# Patient Record
Sex: FEMALE | Race: WHITE | Employment: FULL TIME | URBAN - METROPOLITAN AREA
[De-identification: names, ages, dates, MRNs, and addresses within clinical notes are randomized per-mention and may not be internally consistent; named-entity substitution may affect disease eponyms.]

---

## 2019-08-16 ENCOUNTER — RECORDS - HEALTHEAST (OUTPATIENT)
Dept: LAB | Facility: CLINIC | Age: 29
End: 2019-08-16

## 2019-08-16 LAB
ALBUMIN SERPL-MCNC: 4.2 G/DL (ref 3.5–5)
ALP SERPL-CCNC: 52 U/L (ref 45–120)
ALT SERPL W P-5'-P-CCNC: 42 U/L (ref 0–45)
ANION GAP SERPL CALCULATED.3IONS-SCNC: 7 MMOL/L (ref 5–18)
AST SERPL W P-5'-P-CCNC: 28 U/L (ref 0–40)
BILIRUB SERPL-MCNC: 0.2 MG/DL (ref 0–1)
BUN SERPL-MCNC: 15 MG/DL (ref 8–22)
CALCIUM SERPL-MCNC: 9.6 MG/DL (ref 8.5–10.5)
CHLORIDE BLD-SCNC: 104 MMOL/L (ref 98–107)
CHOLEST SERPL-MCNC: 179 MG/DL
CO2 SERPL-SCNC: 25 MMOL/L (ref 22–31)
CREAT SERPL-MCNC: 0.96 MG/DL (ref 0.6–1.1)
FASTING STATUS PATIENT QL REPORTED: ABNORMAL
GFR SERPL CREATININE-BSD FRML MDRD: >60 ML/MIN/1.73M2
GLUCOSE BLD-MCNC: 100 MG/DL (ref 70–125)
HDLC SERPL-MCNC: 50 MG/DL
LDLC SERPL CALC-MCNC: 94 MG/DL
POTASSIUM BLD-SCNC: 4.1 MMOL/L (ref 3.5–5)
PROT SERPL-MCNC: 6.6 G/DL (ref 6–8)
SODIUM SERPL-SCNC: 136 MMOL/L (ref 136–145)
TRIGL SERPL-MCNC: 174 MG/DL

## 2019-08-21 LAB
HPV SOURCE: NORMAL
HUMAN PAPILLOMA VIRUS 16 DNA: NEGATIVE
HUMAN PAPILLOMA VIRUS 18 DNA: NEGATIVE
HUMAN PAPILLOMA VIRUS FINAL DIAGNOSIS: NORMAL
HUMAN PAPILLOMA VIRUS OTHER HR: NEGATIVE
SPECIMEN DESCRIPTION: NORMAL

## 2019-08-27 LAB
BKR LAB AP ABNORMAL BLEEDING: NO
BKR LAB AP BIRTH CONTROL/HORMONES: NORMAL
BKR LAB AP CERVICAL APPEARANCE: NORMAL
BKR LAB AP GYN ADEQUACY: NORMAL
BKR LAB AP GYN INTERPRETATION: NORMAL
BKR LAB AP HPV REFLEX: NORMAL
BKR LAB AP LMP: NORMAL
BKR LAB AP PATIENT STATUS: NORMAL
BKR LAB AP PREVIOUS ABNORMAL: NORMAL
BKR LAB AP PREVIOUS NORMAL: 2015
HIGH RISK?: NO
PATH REPORT.COMMENTS IMP SPEC: NORMAL
RESULT FLAG (HE HISTORICAL CONVERSION): NORMAL

## 2020-04-27 ENCOUNTER — RECORDS - HEALTHEAST (OUTPATIENT)
Dept: LAB | Facility: CLINIC | Age: 30
End: 2020-04-27

## 2020-04-27 LAB
ALBUMIN SERPL-MCNC: 3.9 G/DL (ref 3.5–5)
ALP SERPL-CCNC: 53 U/L (ref 45–120)
ALT SERPL W P-5'-P-CCNC: 16 U/L (ref 0–45)
ANION GAP SERPL CALCULATED.3IONS-SCNC: 8 MMOL/L (ref 5–18)
AST SERPL W P-5'-P-CCNC: 15 U/L (ref 0–40)
BILIRUB SERPL-MCNC: 0.4 MG/DL (ref 0–1)
BUN SERPL-MCNC: 12 MG/DL (ref 8–22)
CALCIUM SERPL-MCNC: 9.2 MG/DL (ref 8.5–10.5)
CHLORIDE BLD-SCNC: 105 MMOL/L (ref 98–107)
CHOLEST SERPL-MCNC: 211 MG/DL
CO2 SERPL-SCNC: 24 MMOL/L (ref 22–31)
CREAT SERPL-MCNC: 1 MG/DL (ref 0.6–1.1)
FASTING STATUS PATIENT QL REPORTED: ABNORMAL
GFR SERPL CREATININE-BSD FRML MDRD: >60 ML/MIN/1.73M2
GLUCOSE BLD-MCNC: 84 MG/DL (ref 70–125)
HDLC SERPL-MCNC: 59 MG/DL
LDLC SERPL CALC-MCNC: 133 MG/DL
POTASSIUM BLD-SCNC: 4 MMOL/L (ref 3.5–5)
PROT SERPL-MCNC: 6.7 G/DL (ref 6–8)
SODIUM SERPL-SCNC: 137 MMOL/L (ref 136–145)
TRIGL SERPL-MCNC: 97 MG/DL

## 2021-12-28 ENCOUNTER — LAB REQUISITION (OUTPATIENT)
Dept: LAB | Facility: CLINIC | Age: 31
End: 2021-12-28
Payer: COMMERCIAL

## 2021-12-28 DIAGNOSIS — E78.5 HYPERLIPIDEMIA, UNSPECIFIED: ICD-10-CM

## 2021-12-28 DIAGNOSIS — R11.0 NAUSEA: ICD-10-CM

## 2021-12-28 LAB
ALBUMIN SERPL-MCNC: 3.6 G/DL (ref 3.5–5)
ALP SERPL-CCNC: 59 U/L (ref 45–120)
ALT SERPL W P-5'-P-CCNC: 14 U/L (ref 0–45)
ANION GAP SERPL CALCULATED.3IONS-SCNC: 7 MMOL/L (ref 5–18)
AST SERPL W P-5'-P-CCNC: 14 U/L (ref 0–40)
BILIRUB SERPL-MCNC: 0.2 MG/DL (ref 0–1)
BUN SERPL-MCNC: 11 MG/DL (ref 8–22)
CALCIUM SERPL-MCNC: 9.2 MG/DL (ref 8.5–10.5)
CHLORIDE BLD-SCNC: 109 MMOL/L (ref 98–107)
CHOLEST SERPL-MCNC: 202 MG/DL
CO2 SERPL-SCNC: 23 MMOL/L (ref 22–31)
CREAT SERPL-MCNC: 0.85 MG/DL (ref 0.6–1.1)
FASTING STATUS PATIENT QL REPORTED: ABNORMAL
GFR SERPL CREATININE-BSD FRML MDRD: >90 ML/MIN/1.73M2
GLUCOSE BLD-MCNC: 96 MG/DL (ref 70–125)
HDLC SERPL-MCNC: 52 MG/DL
LDLC SERPL CALC-MCNC: 135 MG/DL
POTASSIUM BLD-SCNC: 4.4 MMOL/L (ref 3.5–5)
PROT SERPL-MCNC: 6.2 G/DL (ref 6–8)
SODIUM SERPL-SCNC: 139 MMOL/L (ref 136–145)
TRIGL SERPL-MCNC: 75 MG/DL

## 2021-12-28 PROCEDURE — 80061 LIPID PANEL: CPT | Mod: ORL | Performed by: FAMILY MEDICINE

## 2021-12-28 PROCEDURE — 80053 COMPREHEN METABOLIC PANEL: CPT | Mod: ORL | Performed by: FAMILY MEDICINE

## 2022-08-29 ENCOUNTER — LAB REQUISITION (OUTPATIENT)
Dept: LAB | Facility: CLINIC | Age: 32
End: 2022-08-29

## 2022-08-29 DIAGNOSIS — N39.0 URINARY TRACT INFECTION, SITE NOT SPECIFIED: ICD-10-CM

## 2022-08-29 PROCEDURE — 87086 URINE CULTURE/COLONY COUNT: CPT | Performed by: FAMILY MEDICINE

## 2022-08-30 LAB — BACTERIA UR CULT: ABNORMAL

## 2022-10-04 ENCOUNTER — LAB REQUISITION (OUTPATIENT)
Dept: LAB | Facility: CLINIC | Age: 32
End: 2022-10-04

## 2022-10-04 LAB — TSH SERPL DL<=0.005 MIU/L-ACNC: 0.99 UIU/ML (ref 0.3–4.2)

## 2022-10-04 PROCEDURE — 84443 ASSAY THYROID STIM HORMONE: CPT | Performed by: FAMILY MEDICINE

## 2023-01-03 ENCOUNTER — LAB REQUISITION (OUTPATIENT)
Dept: LAB | Facility: CLINIC | Age: 33
End: 2023-01-03

## 2023-01-03 DIAGNOSIS — Z13.220 ENCOUNTER FOR SCREENING FOR LIPOID DISORDERS: ICD-10-CM

## 2023-01-03 LAB
CHOLEST SERPL-MCNC: 186 MG/DL
HDLC SERPL-MCNC: 48 MG/DL
LDLC SERPL CALC-MCNC: 119 MG/DL
NONHDLC SERPL-MCNC: 138 MG/DL
TRIGL SERPL-MCNC: 95 MG/DL

## 2023-01-03 PROCEDURE — 80061 LIPID PANEL: CPT | Performed by: FAMILY MEDICINE

## 2023-10-05 ENCOUNTER — LAB REQUISITION (OUTPATIENT)
Dept: LAB | Facility: CLINIC | Age: 33
End: 2023-10-05

## 2023-10-05 DIAGNOSIS — R30.0 DYSURIA: ICD-10-CM

## 2023-10-05 PROCEDURE — 87086 URINE CULTURE/COLONY COUNT: CPT | Performed by: FAMILY MEDICINE

## 2023-10-07 LAB — BACTERIA UR CULT: NORMAL

## 2024-04-05 ENCOUNTER — LAB REQUISITION (OUTPATIENT)
Dept: LAB | Facility: CLINIC | Age: 34
End: 2024-04-05

## 2024-04-05 DIAGNOSIS — Z01.419 ENCOUNTER FOR GYNECOLOGICAL EXAMINATION (GENERAL) (ROUTINE) WITHOUT ABNORMAL FINDINGS: ICD-10-CM

## 2024-04-05 DIAGNOSIS — Z13.1 ENCOUNTER FOR SCREENING FOR DIABETES MELLITUS: ICD-10-CM

## 2024-04-05 DIAGNOSIS — E78.5 HYPERLIPIDEMIA, UNSPECIFIED: ICD-10-CM

## 2024-04-05 PROCEDURE — 80048 BASIC METABOLIC PNL TOTAL CA: CPT | Performed by: FAMILY MEDICINE

## 2024-04-05 PROCEDURE — 80061 LIPID PANEL: CPT | Performed by: FAMILY MEDICINE

## 2024-04-05 PROCEDURE — G0145 SCR C/V CYTO,THINLAYER,RESCR: HCPCS | Performed by: FAMILY MEDICINE

## 2024-04-05 PROCEDURE — 87624 HPV HI-RISK TYP POOLED RSLT: CPT | Performed by: FAMILY MEDICINE

## 2024-04-06 LAB
ANION GAP SERPL CALCULATED.3IONS-SCNC: 11 MMOL/L (ref 7–15)
BUN SERPL-MCNC: 18.4 MG/DL (ref 6–20)
CALCIUM SERPL-MCNC: 9.6 MG/DL (ref 8.6–10)
CHLORIDE SERPL-SCNC: 99 MMOL/L (ref 98–107)
CHOLEST SERPL-MCNC: 208 MG/DL
CREAT SERPL-MCNC: 1 MG/DL (ref 0.51–0.95)
DEPRECATED HCO3 PLAS-SCNC: 25 MMOL/L (ref 22–29)
EGFRCR SERPLBLD CKD-EPI 2021: 75 ML/MIN/1.73M2
FASTING STATUS PATIENT QL REPORTED: ABNORMAL
GLUCOSE SERPL-MCNC: 88 MG/DL (ref 70–99)
HDLC SERPL-MCNC: 52 MG/DL
LDLC SERPL CALC-MCNC: 132 MG/DL
NONHDLC SERPL-MCNC: 156 MG/DL
POTASSIUM SERPL-SCNC: 3.8 MMOL/L (ref 3.4–5.3)
SODIUM SERPL-SCNC: 135 MMOL/L (ref 135–145)
TRIGL SERPL-MCNC: 121 MG/DL

## 2024-04-10 LAB
BKR LAB AP GYN ADEQUACY: NORMAL
BKR LAB AP GYN INTERPRETATION: NORMAL
BKR LAB AP HPV REFLEX: NORMAL
BKR LAB AP PREVIOUS ABNORMAL: NORMAL
PATH REPORT.COMMENTS IMP SPEC: NORMAL
PATH REPORT.COMMENTS IMP SPEC: NORMAL
PATH REPORT.RELEVANT HX SPEC: NORMAL

## 2024-04-15 LAB
HUMAN PAPILLOMA VIRUS 16 DNA: NEGATIVE
HUMAN PAPILLOMA VIRUS 18 DNA: NEGATIVE
HUMAN PAPILLOMA VIRUS FINAL DIAGNOSIS: NORMAL
HUMAN PAPILLOMA VIRUS OTHER HR: NEGATIVE

## 2024-04-26 ENCOUNTER — OFFICE VISIT (OUTPATIENT)
Dept: PHARMACY | Facility: PHYSICIAN GROUP | Age: 34
End: 2024-04-26
Payer: COMMERCIAL

## 2024-04-26 DIAGNOSIS — G43.909 MIGRAINE WITHOUT STATUS MIGRAINOSUS, NOT INTRACTABLE, UNSPECIFIED MIGRAINE TYPE: ICD-10-CM

## 2024-04-26 DIAGNOSIS — Z13.79 ENCOUNTER FOR PHARMACOGENETIC TESTING: ICD-10-CM

## 2024-04-26 DIAGNOSIS — E88.89 CYP2C19 RAPID METABOLIZER (H): ICD-10-CM

## 2024-04-26 DIAGNOSIS — Z78.9 TAKES DIETARY SUPPLEMENTS: ICD-10-CM

## 2024-04-26 DIAGNOSIS — R25.1 TREMOR: ICD-10-CM

## 2024-04-26 DIAGNOSIS — G47.00 INSOMNIA, UNSPECIFIED TYPE: ICD-10-CM

## 2024-04-26 DIAGNOSIS — F41.9 ANXIETY: Primary | ICD-10-CM

## 2024-04-26 DIAGNOSIS — G47.00 INSOMNIA: ICD-10-CM

## 2024-04-26 DIAGNOSIS — K21.9 GASTROESOPHAGEAL REFLUX DISEASE, UNSPECIFIED WHETHER ESOPHAGITIS PRESENT: ICD-10-CM

## 2024-04-26 DIAGNOSIS — F90.1 ATTENTION DEFICIT HYPERACTIVITY DISORDER (ADHD), PREDOMINANTLY HYPERACTIVE TYPE: ICD-10-CM

## 2024-04-26 DIAGNOSIS — N94.6 DYSMENORRHEA: ICD-10-CM

## 2024-04-26 DIAGNOSIS — F32.9 MAJOR DEPRESSIVE DISORDER, REMISSION STATUS UNSPECIFIED, UNSPECIFIED WHETHER RECURRENT: ICD-10-CM

## 2024-04-26 PROCEDURE — 99605 MTMS BY PHARM NP 15 MIN: CPT | Performed by: PHARMACIST

## 2024-04-26 PROCEDURE — 99607 MTMS BY PHARM ADDL 15 MIN: CPT | Performed by: PHARMACIST

## 2024-04-26 NOTE — PROGRESS NOTES
Medication Therapy Management (MTM) Encounter    ASSESSMENT:                            Medication Adherence/Access: No issues identified  _  Pharmacogenetic Assessment and Recommendations:   Variability in CYP2D6, VGQ9O89, CYP2C9 and other genes can impact the effectiveness and risk toxicity of certain medications used. Based on this patient's pharmacogenetic test results and clinical assessment, consider the following:   - [id number] rapid metabolizer- may have increased metabolism and reduced levels of citalopram and escitalopram when tried in the past. Sertraline is less impacted and IC guidelines recommend initiating therapy at recommended starting dose. May need to titrate dose up further based on response. Omeprazole is a moderate inhibitor so may also help achieve adequate drug levels.     - CYP2D6 Poor Metabolizer- expected to have increased levels of medications metabolized by CYP2D6 (e.g., TCAs, atomoxetine, paroxetine, fluvoxamine, ondansetron ) and may be at increased risk of side effects. Expected to have decreased levels of medications activated by CYP2D6 (e.g., codeine, tramadol) and may be at risk for lack of efficacy.     - Patient has decreased conversion of folic acid to the active form of L-methylfolate.  This genotype has been associated with decreased response to antidepressants.  There have been some clinical trials completed, but not enough for clinical guidelines or the FDA to recommend starting a supplement.  Trial on supplement would not likely be harmful and may provide some benefit.  L-methylfolate has some evidence for benefit in depression treatment, it's effect on anxiety have not been studied.  Could consider trying L-methylfolate supplement 10-15 mg daily for 2-3 months to see if it offers benefit for mood.  _  Anxiety, Depression, and Insomnia  Discussed medication options with patient at length, including vilazodone, sertraline and fluoxetine. Higher risk for GI side effects with  vilazodone is concerning for patient. She has hesitation with trying fluoxetine. Based on symptoms, pharmacogenetic results, and previous medication trials she may benefit from trying sertraline. Reviewed sertraline action, dosing, onset of benefits, and possible side effects. May need to increase up to higher dose to see full benefit. Reviewed limited evidence with l-methylfolate supplementation and she would like to try this. Appropriate to continue current trazodone, hydroxyzine and medical cannabis as needed. Encouraged to continue to try to establish care with a new psychiatry provider.   _  ADHD:    Stable.   _  Migraine:   Stable.   _  Tremors:    Stable.  _  GERD    Stable.  _  Dysmenorrhea:    Stable.  _  Supplements   No significant interactions with current supplements and medications/conditions. Reviewed evidence of current supplements with her and if no benefit seen may consider stopping zinc and ashwaghanda one at time to see if notes any changes.     PLAN:                            Start sertraline (Zoloft) 50 mg once daily.   May need to increase dose more quickly, check in after 4 weeks if not with a new psychiatrist to see if we should increase the dose. It can take 4-6 weeks to see the full effect.     2. Okay to try l-methylfolate 15 mg once daily.     Sent order to your pharmacy bit if it is not covered by your insurance you can try using a [employer] coupon to see if it helps with the cost.     Studies have shown that L-methylfolate supplement alone or in addition to an antidepressant can help reduce depressive symptoms. One study has shown that depressed patients with variation in MTHFR may benefit more from taking L-methylfolate. L-methylfolate doses used in the studies varied, but 15 mg once daily was most common. Recommend trying for 3 months and if not noticing any difference then stop.     Follow-up: with me or Dr. Street in 4 weeks    SUBJECTIVE/OBJECTIVE:                           Arpita Pendleton is a 33 year old female coming in for an initial visit. She was referred to me from Dr. Street.      Reason for visit: Mental health medications, discuss pharmacogenetic results she had done previously.    Allergies/ADRs: Reviewed in chart  Past Medical History: Reviewed in chart  Tobacco: She reports that she has been smoking cigarettes. She has never used smokeless tobacco.Nicotine/Tobacco Cessation Plan  Self help information given to patient  Alcohol: none, history of alcohol dependence, 5 years sober    Medication Adherence/Access: no issues reported    Pharmacogenetic (PGx) Results:   She had [employer] test done through her previous psychiatrist. She brings in the results today to review and discuss medication options.     Results relevant for PGx consult reason:  - [id number]  rapid metabolizer: increased TBM7Z00 function  - CYP2D6 poor metabolizer: no CYP2D6 function   - CYP2B6 intermediate metabolizer: decreased CYP2B6 function  - CYP2C9 intermediate metabolizer: decreased CYP2C9 function     Other notable PGx results:  - DUXT8H7 decreased function    Genes with lower quality of evidence:  * Not as many large powered studies are available for the below genes.  There are not currently treatment recommendations or guidelines regarding these genes.    - MTHFR 677 CT, 1298 AC- severely decreased activity    Drug interactions (as of 5/1/2024):  Omeprazole : [id number] Moderate Inhibitor --> results in phenoconversion to TTL8V27 normal metabolizer    Anxiety, Depression, and Insomnia   Trazodone 150 mg once daily  Hydroxyzine 25 mg at bedtime, and as needed   Cannabis mix vaporized/flower- hybrid daily, indica at night   Patient reports no current medication side effects. She had been following with psychiatry for prescribing but is in the process of finding a new psychiatrist. She is not currently taking any maintenance antidepressant or anxiety medications. She does feel like it would be helpful for her.  She has had many medication trials in the past, she can't really remember her experience with most of them. She thinks most were just not effective.   Medication History: per patient report, may be more  - Wellbutrin  - Abilify  - citalopram  - escitalopram  - venlafaxine- angry  - quetiapine  - duloxetine  - Vraylar     ADHD:    Adderall XR 15 mg once daily in morning  Adderall XR 10 mg once daily afternoon    This has been very helpful for her. No issues reported. She does have sleep trouble and takes trazodone but this was the case before Adderall and did not worsen since being on it.     Migraine:   Preventive medications  Propranolol 10 mg once daily-   Acute medications  Sumatriptan 100 mg  Ibuprofen as needed   Patient reports having 2 headache days last month requiring sumatriptan. She doesn't really like how she feels when she takes sumatriptan so typically only takes when she can feel migraine coming on to stop it. She also rests, goes in a dark room which helps. Reports propranolol was started for her tremors but she isn't sure if she noticed an improvement in her migraines since taking it. She has not been on a higher dose.     Tremors:    Propranolol 10 mg once daily  She reports taking this for tremors. She does think it helps. No issues reported.       GERD    Omeprazole 20 mg once daily   Patient feels that current regimen is effective.  The patient does notice symptoms if they miss a dose. She has had a lot of stomach issues in the past. Her symptoms seem to be pretty well controlled currently.        Dysmenorrhea:    Mirena IUD  No issues reported. IUD good until 2029.       Recurrent cold sores:    Valacyclovir 1 g 2 tablets at onset, then repeat 2 tablets in 12 hours  Not used recently. Does help when she has had to use it. No issues reported.      Supplements   Vitamin D 5000 units daily  B complex (pure encapasations) 1 tablet daily  Ashwaganda Youtheory 500 mg 2 capsules  Multivitamin women  (centrum) 1 tablet daily  Magnesium citrate 250 mg daily   Zinc 30 mg once daily- mood?   Rachael 550 mg once daily   No reported issues at this time. She started taking some of these supplements at the recommendation of her previous psychiatry provider. She isn't sure if she has noticed much different in her mood with them. She does think the rachael helps her stomach.         Today's Vitals: /70 (BP Location: Right arm, Patient Position: Sitting, Cuff Size: Adult Regular)   Pulse 93   ----------------      I spent 60 minutes with this patient today. All changes were made via collaborative practice agreement with Jacqueline Street MD. A copy of the visit note was provided to the patient's provider(s).    A summary of these recommendations was given to the patient.    Michaela Soto, PharmD, [facility]  Medication Therapy Management Pharmacist  [facility]  [phone]           Medication Therapy Recommendations  Major depressive disorder, remission status unspecified, unspecified whether recurrent    Rationale: Synergistic therapy - Needs additional medication therapy - Indication   Recommendation: Start Medication - sertraline 50 MG tablet   Status: Accepted per CPA          Rationale: Synergistic therapy - Needs additional medication therapy - Indication   Recommendation: Start Medication - methylfolate 15 MG Tabs tablet   Status: Accepted per CPA

## 2024-04-26 NOTE — PATIENT INSTRUCTIONS
Recommendations from today's MTM visit:                                                    MTM (medication therapy management) is a service provided by a clinical pharmacist designed to help you get the most of out of your medicines.      Start sertraline (Zoloft) 50 mg once daily.   May need to increase dose more quickly, check in after 4 weeks if not with a new psychiatrist to see if we should increase the dose. It can take 4-6 weeks to see the full effect.     2. Start l-methylfolate 15 mg once daily.     Sent order to your pharmacy bit if it is not covered by your insurance you can try using a [employer] coupon to see if it helps with the cost.     Studies have shown that L-methylfolate supplement alone or in addition to an antidepressant can help reduce depressive symptoms. One study has shown that depressed patients with variation in MTHFR may benefit more from taking L-methylfolate. L-methylfolate doses used in the studies varied, but 15 mg once daily was most common. Recommend trying for 3 months and if not noticing any difference then stop.     Follow-up: with me or Dr. Street in 4 weeks    It was great speaking with you today.  I value your experience and would be very thankful for your time in providing feedback in our clinic survey. In the next few days, you may receive an email or text message from [employer] with a link to a survey related to your clinical pharmacist.    To schedule another MTM appointment, please call [phone].    My Clinical Pharmacist's contact information:                                                      Please feel free to contact me with any questions or concerns you have.      Michaela Soto, PharmD, BCACP  Medication Therapy Management Pharmacist  [facility]  [phone]

## 2024-05-01 VITALS — SYSTOLIC BLOOD PRESSURE: 110 MMHG | HEART RATE: 93 BPM | DIASTOLIC BLOOD PRESSURE: 70 MMHG

## 2024-05-01 PROBLEM — E88.89 CYP2C19 RAPID METABOLIZER (H): Status: ACTIVE | Noted: 2024-05-01

## 2024-05-01 RX ORDER — PARSLEY 450 MG
2 CAPSULE ORAL DAILY
COMMUNITY

## 2024-05-01 RX ORDER — HYDROXYZINE HYDROCHLORIDE 25 MG/1
25 TABLET, FILM COATED ORAL 3 TIMES DAILY PRN
COMMUNITY

## 2024-05-01 RX ORDER — ASCORBIC ACID 125 MG
250 TABLET,CHEWABLE ORAL DAILY
COMMUNITY

## 2024-05-01 RX ORDER — DEXTROAMPHETAMINE SACCHARATE, AMPHETAMINE ASPARTATE MONOHYDRATE, DEXTROAMPHETAMINE SULFATE AND AMPHETAMINE SULFATE 2.5; 2.5; 2.5; 2.5 MG/1; MG/1; MG/1; MG/1
10 CAPSULE, EXTENDED RELEASE ORAL DAILY
COMMUNITY

## 2024-05-01 RX ORDER — DEXTROAMPHETAMINE SACCHARATE, AMPHETAMINE ASPARTATE MONOHYDRATE, DEXTROAMPHETAMINE SULFATE AND AMPHETAMINE SULFATE 3.75; 3.75; 3.75; 3.75 MG/1; MG/1; MG/1; MG/1
15 CAPSULE, EXTENDED RELEASE ORAL DAILY
COMMUNITY

## 2024-05-01 RX ORDER — PROPRANOLOL HYDROCHLORIDE 10 MG/1
10 TABLET ORAL DAILY
COMMUNITY

## 2024-05-01 RX ORDER — GINGER ROOT 550 MG
550 CAPSULE ORAL DAILY
COMMUNITY

## 2024-05-01 RX ORDER — VALACYCLOVIR HYDROCHLORIDE 1 G/1
2000 TABLET, FILM COATED ORAL ONCE
COMMUNITY

## 2024-05-01 RX ORDER — TRAZODONE HYDROCHLORIDE 150 MG/1
150 TABLET ORAL AT BEDTIME
COMMUNITY

## 2024-05-01 RX ORDER — IBUPROFEN 200 MG
200 TABLET ORAL EVERY 8 HOURS PRN
COMMUNITY

## 2024-05-01 RX ORDER — SUMATRIPTAN 100 MG/1
100 TABLET, FILM COATED ORAL
COMMUNITY

## 2024-05-17 ENCOUNTER — OFFICE VISIT (OUTPATIENT)
Dept: PHARMACY | Facility: PHYSICIAN GROUP | Age: 34
End: 2024-05-17
Payer: COMMERCIAL

## 2024-05-17 VITALS — DIASTOLIC BLOOD PRESSURE: 68 MMHG | SYSTOLIC BLOOD PRESSURE: 116 MMHG

## 2024-05-17 DIAGNOSIS — F41.9 ANXIETY: ICD-10-CM

## 2024-05-17 DIAGNOSIS — F32.9 MAJOR DEPRESSIVE DISORDER, REMISSION STATUS UNSPECIFIED, UNSPECIFIED WHETHER RECURRENT: Primary | ICD-10-CM

## 2024-05-17 DIAGNOSIS — G47.00 INSOMNIA, UNSPECIFIED TYPE: ICD-10-CM

## 2024-05-17 PROCEDURE — 99606 MTMS BY PHARM EST 15 MIN: CPT | Performed by: PHARMACIST

## 2024-05-17 PROCEDURE — 99607 MTMS BY PHARM ADDL 15 MIN: CPT | Performed by: PHARMACIST

## 2024-05-17 NOTE — PROGRESS NOTES
Medication Therapy Management (MTM) Encounter    ASSESSMENT:                            Medication Adherence/Access: No issues identified    Mental Health:   Tolerating sertraline well with appears to be some symptoms improvement given reduced PHQ-9 scores and no recent panic attacks. She may benefit from dose escalation as anticipated based on pharmacogenetic profile to see if that better targets continued anxiety symptoms.     PLAN:                            Increase sertraline to 75 mg for 2 weeks, then increase to 100 mg daily  Call pharmacy to refill 50 mg tablets and take 1 and 1/2 for 2 weeks, then take 2 tablets until that is gone.   Then sent sertraline 100 mg tablets.     Follow-up: Return in 6 weeks (on 6/28/2024) for with me, in person at 8:30 AM.    Michaela Soto, PharmD, BCACP  Medication Therapy Management Pharmacist  [facility]  [phone]    SUBJECTIVE/OBJECTIVE:                          Arpita Pendleton is a 33 year old female coming in for a follow-up visit.       Reason for visit: Mental health medication follow-up.    Allergies/ADRs: Reviewed in chart  Past Medical History: Reviewed in chart  Tobacco: She reports that she has been smoking cigarettes. She has never used smokeless tobacco.Nicotine/Tobacco Cessation Plan  Self help information given to patient  Alcohol: history of alcohol dependence, 5 years sober    Medication Adherence/Access: no issues reported    Mental Health     Anxiety and Depression  Sertraline 50 mg once daily  Trazodone 150 mg once daily  Hydroxyzine 25 mg at bedtime, and as needed   L-methylfolate 15 mg once daily  Cannabis mix vaporized/flower- hybrid daily, indica at night     She started sertraline after our last visit. She had a little nausea for maybe 5-7 days after starting it but this was not too bothersome and has resolved. She does think it may be helping some but it is hard to tell because she has a lot of stress with a project deadline at work  right now. She thinks maybe if this was happening before it may cause a panic attack and she has not had any panic attacks lately. Still having some anxiety symptoms though. She is open to increasing her dose.   She also started taking l-methylfolate supplement to try along with sertraline and not having any issues with it.   Medication History: per patient report, may be more  - Wellbutrin  - Abilify  - citalopram  - escitalopram  - venlafaxine- angry  - quetiapine  - duloxetine  - Vraylar          Today's Vitals: /68 (BP Location: Right arm, Patient Position: Sitting, Cuff Size: Adult Regular)   ----------------      I spent 20 minutes with this patient today. All changes were made via collaborative practice agreement with Jacqueline Street MD. A copy of the visit note was provided to the patient's provider(s).    A summary of these recommendations was given to the patient.    Michaela Soto, PharmD, BCACP  Medication Therapy Management Pharmacist  [facility]  [phone]           Medication Therapy Recommendations  Anxiety    Current Medication: sertraline (ZOLOFT) 50 MG tablet   Rationale: Dose too low - Dosage too low - Effectiveness   Recommendation: Increase Dose   Status: Accepted per CPA

## 2024-05-17 NOTE — PATIENT INSTRUCTIONS
Recommendations from today's MTM visit:                                                         Increase sertraline to 75 mg for 2 weeks, then increase to 100 mg daily  Call pharmacy to refill 50 mg tablets and take 1 and 1/2 for 2 weeks, then take 2 tablets until that is gone.   Then sent sertraline 100 mg tablets.       Follow-up: 6 weeks Shakila 28 at 8:30 AM    It was great speaking with you today.  I value your experience and would be very thankful for your time in providing feedback in our clinic survey. In the next few days, you may receive an email or text message from [employer] with a link to a survey related to your clinical pharmacist.    To schedule another MTM appointment, please call [phone].    My Clinical Pharmacist's contact information:                                                      Please feel free to contact me with any questions or concerns you have.      Michaela Soto, PharmD, [facility]  Medication Therapy Management Pharmacist  [facility]  [phone]

## 2024-06-28 ENCOUNTER — OFFICE VISIT (OUTPATIENT)
Dept: PHARMACY | Facility: PHYSICIAN GROUP | Age: 34
End: 2024-06-28
Payer: COMMERCIAL

## 2024-06-28 DIAGNOSIS — F32.9 MAJOR DEPRESSIVE DISORDER, REMISSION STATUS UNSPECIFIED, UNSPECIFIED WHETHER RECURRENT: Primary | ICD-10-CM

## 2024-06-28 DIAGNOSIS — F41.9 ANXIETY: ICD-10-CM

## 2024-06-28 PROCEDURE — 99606 MTMS BY PHARM EST 15 MIN: CPT | Performed by: PHARMACIST

## 2024-06-28 RX ORDER — SERTRALINE HYDROCHLORIDE 100 MG/1
100 TABLET, FILM COATED ORAL DAILY
COMMUNITY

## 2024-06-28 ASSESSMENT — ANXIETY QUESTIONNAIRES
6. BECOMING EASILY ANNOYED OR IRRITABLE: SEVERAL DAYS
2. NOT BEING ABLE TO STOP OR CONTROL WORRYING: NOT AT ALL
5. BEING SO RESTLESS THAT IT IS HARD TO SIT STILL: NOT AT ALL
7. FEELING AFRAID AS IF SOMETHING AWFUL MIGHT HAPPEN: NOT AT ALL
GAD7 TOTAL SCORE: 2
3. WORRYING TOO MUCH ABOUT DIFFERENT THINGS: NOT AT ALL
1. FEELING NERVOUS, ANXIOUS, OR ON EDGE: SEVERAL DAYS
GAD7 TOTAL SCORE: 2

## 2024-06-28 ASSESSMENT — PATIENT HEALTH QUESTIONNAIRE - PHQ9
5. POOR APPETITE OR OVEREATING: NOT AT ALL
SUM OF ALL RESPONSES TO PHQ QUESTIONS 1-9: 4

## 2024-06-28 NOTE — PROGRESS NOTES
Medication Therapy Management (MTM) Encounter    ASSESSMENT:                            Medication Adherence/Access: No issues identified  _  Mental Health:   Improved. She would benefit from continuing on current sertraline dose.     PLAN:                            Continue sertraline 100 mg once daily. Sent refill to pharmacy for 90 days.     Follow-up: med check with Dr. Street in October or sooner if symptoms worsen    SUBJECTIVE/OBJECTIVE:                          Arpita Pendleton is a 33 year old female seen for a follow-up visit.       Reason for visit: Mental health follow-up.    Allergies/ADRs: Reviewed in chart  Past Medical History: Reviewed in chart  Tobacco: She reports that she has been smoking cigarettes. She has never used smokeless tobacco.Nicotine/Tobacco Cessation Plan  Self help information given to patient  Alcohol: history of alcohol dependence, 5 years sober    Medication Adherence/Access:   No issues reported. She would prefer to get 90 day supplies of her medications if possible.     Mental Health   Anxiety, Depression  Sertraline 100 mg once daily  Hydroxyzine 25 mg at bedtime, and as needed   L-methylfolate 15 mg once daily  Cannabis mix vaporized/flower- hybrid daily, indica at night     She has been taking 100 mg sertraline for about 4 weeks now. She does think it is helping. She had a 2 week vacation off work and now has been back about 2 weeks and does feel like she has felt less stressed and anxious being back than she would have been before. No recent panic attacks. She is not feeling as down or anxious. She did notice some less firm stools when increasing the dose but this has improved.   She also taking l-methylfolate supplement to try along with sertraline and not having any issues with it.   PHQ-9 today- 4  CHASTITY-7 today- 2  Medication History: per patient report, may be more  - Wellbutrin  - Abilify  - citalopram  - escitalopram  - venlafaxine- angry  - quetiapine  - duloxetine  -  Vraylar          Today's Vitals: There were no vitals taken for this visit.  ----------------      I spent 15 minutes with this patient today. All changes were made via collaborative practice agreement with Jacqueline Street MD. A copy of the visit note was provided to the patient's provider(s).    A summary of these recommendations was given to the patient.    Michaela Soto, PharmD, BCACP  Medication Therapy Management Pharmacist  [facility]  [phone]           Medication Therapy Recommendations  No medication therapy recommendations to display

## 2024-06-28 NOTE — PATIENT INSTRUCTIONS
Recommendations from today's MTM visit:                                                         Continue sertraline 100 mg once daily. Sent refill to pharmacy for 90 days.     Follow-up: med check with Dr. Street in October or sooner if symptoms worsen    It was great speaking with you today.  I value your experience and would be very thankful for your time in providing feedback in our clinic survey. In the next few days, you may receive an email or text message from [employer] with a link to a survey related to your clinical pharmacist.    To schedule another MTM appointment, please call [phone].    My Clinical Pharmacist's contact information:                                                      Please feel free to contact me with any questions or concerns you have.      Michaela Soto, PharmD, [facility]  Medication Therapy Management Pharmacist  [facility]  [phone]

## 2025-04-25 ENCOUNTER — LAB REQUISITION (OUTPATIENT)
Dept: LAB | Facility: CLINIC | Age: 35
End: 2025-04-25

## 2025-04-25 DIAGNOSIS — E78.5 HYPERLIPIDEMIA, UNSPECIFIED: ICD-10-CM

## 2025-04-25 DIAGNOSIS — Z13.1 ENCOUNTER FOR SCREENING FOR DIABETES MELLITUS: ICD-10-CM

## 2025-04-25 PROCEDURE — 82465 ASSAY BLD/SERUM CHOLESTEROL: CPT | Performed by: FAMILY MEDICINE

## 2025-04-25 PROCEDURE — 80048 BASIC METABOLIC PNL TOTAL CA: CPT | Performed by: FAMILY MEDICINE

## 2025-04-26 LAB
ANION GAP SERPL CALCULATED.3IONS-SCNC: 14 MMOL/L (ref 7–15)
BUN SERPL-MCNC: 15.8 MG/DL (ref 6–20)
CALCIUM SERPL-MCNC: 9.2 MG/DL (ref 8.8–10.4)
CHLORIDE SERPL-SCNC: 102 MMOL/L (ref 98–107)
CHOLEST SERPL-MCNC: 222 MG/DL
CREAT SERPL-MCNC: 1.05 MG/DL (ref 0.51–0.95)
EGFRCR SERPLBLD CKD-EPI 2021: 70 ML/MIN/1.73M2
FASTING STATUS PATIENT QL REPORTED: ABNORMAL
FASTING STATUS PATIENT QL REPORTED: ABNORMAL
GLUCOSE SERPL-MCNC: 107 MG/DL (ref 70–99)
HCO3 SERPL-SCNC: 23 MMOL/L (ref 22–29)
HDLC SERPL-MCNC: 54 MG/DL
LDLC SERPL CALC-MCNC: 149 MG/DL
NONHDLC SERPL-MCNC: 168 MG/DL
POTASSIUM SERPL-SCNC: 3.7 MMOL/L (ref 3.4–5.3)
SODIUM SERPL-SCNC: 139 MMOL/L (ref 135–145)
TRIGL SERPL-MCNC: 94 MG/DL